# Patient Record
Sex: FEMALE | Race: BLACK OR AFRICAN AMERICAN | Employment: OTHER | URBAN - METROPOLITAN AREA
[De-identification: names, ages, dates, MRNs, and addresses within clinical notes are randomized per-mention and may not be internally consistent; named-entity substitution may affect disease eponyms.]

---

## 2017-03-06 ENCOUNTER — HOSPITAL ENCOUNTER (OUTPATIENT)
Dept: PHYSICAL THERAPY | Age: 52
Discharge: HOME | End: 2017-03-06
Payer: OTHER GOVERNMENT

## 2017-03-06 PROCEDURE — 97163 PT EVAL HIGH COMPLEX 45 MIN: CPT | Performed by: PHYSICAL THERAPIST

## 2017-03-06 PROCEDURE — 97110 THERAPEUTIC EXERCISES: CPT | Performed by: PHYSICAL THERAPIST

## 2017-03-06 NOTE — PROGRESS NOTES
PT DAILY TREATMENT NOTE/LUMBAR EVAL 3-16    Patient Name: Anamaria Joe  Date:3/6/2017  : 1965  [x]  Patient  Verified  Payor:  / Plan: [facility]  ACTIVE DUTY AND DEPENDENTS / Product Type: Yeimyfranco Munoza /    In time:3:05  Out time: 4:15  Total Treatment Time (min): 60  Visit #: 1 of 24    Treatment Area: Fibromyalgia [M79.7]  Other spondylosis, lumbar region [M47.896]  SUBJECTIVE  Pain Level (0-10 scale): 10  [x]constant []intermittent []improving [x]worsening []no change since onset    Any medication changes, allergies to medications, adverse drug reactions, diagnosis change, or new procedure performed?: [x] No    [] Yes (see summary sheet for update)  Subjective functional status/changes:     PLOF: amb with SPC  Limitations to PLOF: walking, standing, cleaning, dresssing lowe body, bending,   Mechanism of Injury: Insidious, work tasks. Current symptoms/Complaints: pain in neck and back and radiates into right leg. Previous Treatment/Compliance: n/a  PMHx/Surgical Hx: obesity. OA, spondylosis, chronic back pain, depression, fibromyalgia, Asthma, HTN, Latex allergy   Work Hx: prior , unemployed at this time. Living Situation: lives with son, son 25 yo and assists with some household chores. Has 3-4 steps to enter home. Son has stairs to his bedroom. Pt Goals: to get rid of pain and improve ability to dress and function. Barriers: []pain []financial []time []transportation []other  Motivation: fair+  Substance use: []Alcohol []Tobacco []other:   FABQ Score: []low []elevate  Cognition: A & O x 4    Other:    OBJECTIVE/EXAMINATION  Domestic Life: mother of 25 yo,    Activity/Recreational Limitations: none, limited due to pain. Mobility: Amb with SPC, guarded, short community distances and uses walker of rollator for community distances. Self Care: guarded, difficulty, increased time needed to complete tasks.        Modality rationale: decrease edema, decrease inflammation, decrease pain, increase tissue extensibility and increase muscle contraction/control to improve the patients ability to improve mobility    Min Type Additional Details    [] Estim:  []Unatt       []IFC  []Premod                        []Other:  []w/ice   []w/heat  Position:  Location:    [] Estim: []Att    []TENS instruct  []NMES                    []Other:  []w/US   []w/ice   []w/heat  Position:  Location:    []  Traction: [] Cervical       []Lumbar                       [] Prone          []Supine                       []Intermittent   []Continuous Lbs:  [] before manual  [] after manual    []  Ultrasound: []Continuous   [] Pulsed                           []1MHz   []3MHz Location:  W/cm2:    []  Iontophoresis with dexamethasone         Location: [] Take home patch   [] In clinic    []  Ice     []  heat  []  Ice massage  []  Laser   []  Anodyne Position:  Location:    []  Laser with stim  []  Other: Position:  Location:    []  Vasopneumatic Device Pressure:       [] lo [] med [] hi   Temperature: [] lo [] med [] hi   [x] Skin assessment post-treatment:  [x]intact [x]redness- no adverse reaction    []redness - adverse reaction:     45 min [x]Eval                  []Re-Eval       15 min Therapeutic Exercise:  [x] See flow sheet :   Rationale: increase ROM, increase strength, improve coordination, improve balance and increase proprioception to improve the patients ability to improve mobility               With   [] TE   [] TA   [] neuro   [] other: Patient Education: [x] Review HEP    [] Progressed/Changed HEP based on:   [] positioning   [] body mechanics   [] transfers   [] heat/ice application    [] other:      Other Objective/Functional Measures:  ObeseBMI > 30 %    Physical Therapy Evaluation - Lumbar Spine ([facility])    SUBJECTIVE  Chief Complaint: neck and back pain with rad.  In right leg along Dermatome L5  Mechanism of injury: 2003 lifting items into a truck in the [employer], pain started and persisted since then. Symptoms:  Aggravated by:   [x] Bending [x] Sitting [x] Standing [x] Walking   [] Moving [] Cough [] Sneeze [] Valsalva   [] AM  [] PM  Lying:  [] sup   [] pro   [] sidelying   [] Other: walking, sleeping on back or right side. Eased by:    [] Bending [] Sitting [] Standing [] Walking   [] Moving [] AM  [] PM  Lying: [x] sup  [] pro  [x] sidelying   [] Other:     General Health:  Red Flags Indicated? [] Yes    [] No  [] Yes [] No Recent weight change (If yes, due to dieting? [] Yes  [] No)   [] Yes [] No Weakness in legs during walking  [] Yes [] No Unremitting pain at night  [] Yes [] No Abdominal pain or problems  [] Yes [] No Rectal bleeding  [] Yes [] No Feet more cold or painful in cold weather  [] Yes [] No Menstrual irregularities  [] Yes [] No Blood or pain with urination  [] Yes [] No Dysfunction of bowel or bladder  [] Yes [] No Recent illness within past 3 weeks (i.e, cold, flu)  [] Yes [] No Numbness/tingling in buttock/genitalia region    Past History/Treatments:     Diagnostic Tests: [x] Lab work [] X-rays    [] CT [x] MRI     [] Other:  Results:pt uncertain of results and reports she may have bulging discs. PT asked client to bring in any written reports for MRI or x-rays on next session. Functional Status  Prior level of function:  -50Present functional limitations:  What position do you sleep in?:    OBJECTIVE  Posture:  Lateral Shift: [] R    [] L     [] +  [] -  Kyphosis: [x] Increased [] Decreased   []  WNL  Lordosis:  [] Increased [x] Decreased   [] WNL  Pelvic symmetry: [] WNL    [] Other:to difficult to assess due to guarding with motion and difficulty performing forward flexion test due to pain and intermittent spasms in the right leg. Gait:  [] Normal     [] Abnormal:    Active Movements: [] N/A   [] Too acute   [] Other:  ROM % AROM % PROM Comments:pain, area   Forward flexion 40-60 -50%  Pain and guarding with all motions.     Extension 20-30 -25%     SB right 20-30 -50%     SB left 20-30 -50%     Rotation right 5-10 -50%     Rotation left 5-10 -50%       Repeated Movements   Effects on present pain: produces (IL), abolishes (A), increases (incr), decreases (decr), centralizes (C), peripheral (PH), no effect (NE)   Pre-Test Sx Flexion Repeated Flexion Extension Repeated Extension Repeated SBL Repeated SBR   Sitting  NC  NC NC     Standing  NC  NC NC     Lying  NC  NC NC N/A N/A   Comments: pt could not determined if Flex or Ext made a difference to her pain due to being in moderate pain this date. Side Glide:  Sustained passive positioning test:    Neuro Screen [] WNL  Myotome/Dermatome/Reflexes: L4 knee jerk 1+ diminished  Comments:  Sensation diminished in right shin on lateral side. Dural Mobility:  SLR Sitting: [] R    [] L    [] +    [] -  @ (degrees):           Supine: [] R    [] L    [] +    [] -  @ (degrees):   Slump Test: [] R    [] L    [] +    [] -  @ (degrees):   Prone Knee Bend: [] R    [] L    [] +    [] -     Palpation  [] Min  [] Mod  [x] Severe    Location: chata quads, paraspinals, IT bands,   [] Min  [] Mod  [x] Severe    Location: buttocks  [] Min  [] Mod  [x] Severe    Location: chata UT, Rhomboids     Strength   L(0-5) R (0-5) N/T   Hip Flexion (L1,2) 4- 4- []   Knee Extension (L3,4) 4- 4- []   Ankle Dorsiflexion (L4) 5 5 []   Great Toe Extension (L5) 5 5 []   Ankle Plantarflexion (S1) 5 5 []   Knee Flexion (S1,2) 4- 4- []   Upper Abdominals   [x]   Lower Abdominals Pain  Pain  [x]   Paraspinals Pain  Pain  [x]   Back Rotators   [x]   Gluteus Robert 4- 4- []   Other   []     Special Tests  Lumbar:  Lumb.  Compression: [] Pos  [] Neg               Lumbar Distraction:   [] Pos  [] Neg    Quadrant:  [] Pos  [] Neg   [] Flex  [] Ext    Sacroilliac:  Gaenslen's: [] R    [] L    [] +    [] -     Compression: [] +    [] -     Gapping:  [] +    [] -     Thigh Thrust: [] R    [] L    [] +    [] -     Leg Length: [] +    [] -   Position:    Crests:  Pt TTP on assessment  ASIS:     PSIS:     Sacral Sulcus:    Mobility: Standing flex: not tested due to pain in low back     Sitting flex: pain still present in low back. Supine to sit:     Prone knee bend:NT         Hip: Peletier Kayla:  [] R    [] L    [] +    [x] -     Scour:  [] R    [] L    [] +    [x] -     Piriformis: [x] R    [x] L    [x] +    [] -          Deficits: Jose's: [] R    [] L    [] +    [] -     Georgiana : [] R    [] L    [] +    [] -     Hamstrings 90/90: 31 with pain in bilateral     Gastrocsoleus (to neutral): Right: Left:       Global Muscular Weakness:  Abdominals:  Quadratus Lumborum:  Paraspinals: Other:    Other tests/comments:       Pain Level (0-10 scale) post treatment: 9    ASSESSMENT/Changes in Function:  See POC    Patient will continue to benefit from skilled PT services to modify and progress therapeutic interventions, address functional mobility deficits, address ROM deficits, address strength deficits, analyze and address soft tissue restrictions, analyze and cue movement patterns, analyze and modify body mechanics/ergonomics, assess and modify postural abnormalities and address imbalance/dizziness to attain remaining goals.      [x]  See Plan of Care  []  See progress note/recertification  []  See Discharge Summary         Progress towards goals / Updated goals:  See POC    PLAN  [x]  Upgrade activities as tolerated     [x]  Continue plan of care  []  Update interventions per flow sheet       []  Discharge due to:_  []  Other:_      Maury Mistry, PT 3/6/2017  3:53 PM

## 2017-03-06 NOTE — PROGRESS NOTES
[facility]  [address] [place], [address]  Ph [phone]  Fx [phone]    Plan of Care/ Statement of Necessity for Physical Therapy Services    Patient name: Roland Miranda Start of Care: 3/6/2017   Referral source: Scarlett Goldstein NP : 1965    Medical Diagnosis: Fibromyalgia [M79.7]  Other spondylosis, lumbar region [M47.896]   Onset Date:     Treatment Diagnosis: Neck pain, back pain, general weakness, difficulty walking, decreased endurance. Spondylosis, radiculopathy in right LE along L4-L5 dermatome. Prior Hospitalization: see medical history Provider#: [id number]   Medications: Verified on Patient summary List    Comorbidities: Chronic back pain, morbid obesity, Fibromyalgia, Depression, Arthritis, HTN, Latex allergy, Asthma,    Prior Level of Function: Amb. with SPC, Mod I with use of cane, Prior community Ambulator. The Plan of Care and following information is based on the information from the initial evaluation. 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 Pt reports her pain began during her deployment in  and her s/sx have worsen over the years. Pt describes pain as sharp and radiating in right LE and achy in the neck and back. Pt reports the pain can be intense and sharp down the right leg along L5 dermatome. Pain increases with walking, bending, cleaning, grooming, prolong standing and sitting. Pt reports poor quality of sleeping. Pt also amb with SPC with unsteady gait and intermittent sharp pain into her right leg. Pt denies any inejctions into the spine other than trigger point injections. Pt states she is not certain of having an MRI performed of the spine. Pt reports pain with driving as well and gets some assistance form her 25 yo son.  Pt has limited CTL spine AROM by 50% with FF and Jose Miguel SB and ROT mostly due to pain and guarding. Pt has 4-/5 jose miguel LE MMT due to pain and fatigue. Pt has 4+/5 jose miguel UE MMT with report on increasing pain in her wrist and elbows. Pt has negative SLR and Spurling's test this date, however she reported general muscle pain with both testing positions. Pt has a TUG score of 33 sec with SPC showing moderate fall risk. Pt will benefit from PT services to address the findings and improve general mobility and balance. Evaluation Complexity History HIGH Complexity :3+ comorbidities / personal factors will impact the outcome/ POC ; Examination HIGH Complexity : 4+ Standardized tests and measures addressing body structure, function, activity limitation and / or participation in recreation  ;Presentation HIGH Complexity : Unstable and unpredictable characteristics  ; Clinical Decision Making HIGH Complexity : FOTO score of 1- 25   Overall Complexity Rating: HIGH   Problem List: pain affecting function, decrease ROM, decrease strength, edema affecting function, impaired gait/ balance, decrease ADL/ functional abilitiies, decrease activity tolerance, decrease flexibility/ joint mobility and decrease transfer abilities   Treatment Plan may include any combination of the following: Therapeutic exercise, Therapeutic activities, Neuromuscular re-education, Physical agent/modality, Gait/balance training, Manual therapy, Aquatic therapy, Patient education, Self Care training, Functional mobility training, Home safety training and Stair training  Patient / Family readiness to learn indicated by: asking questions, trying to perform skills and interest  Persons(s) to be included in education: patient (P)  Barriers to Learning/Limitations: None  Patient Goal (s): To have less pain.   Patient Self Reported Health Status: poor  Rehabilitation Potential: fair    Short Term Goals:  To be accomplished in 4 weeks:   1) Pt will be instructed with a HEP to promote 100% compliance to improve ROM and mobility.    @ Eval: HEP issued. 2) Pt will report 6-7/10 pain at worst in her neck and back to improve exercise tolerance and mobility.    @ Eval: 10/10 at worst in neck and back with radiating LE pain. 3) Pt will have increase FOTO score by 8-10 points to show improve mobility tolerance.   @ Eval: 22/100   4) Pt will report decreased radiating pain into leg by 50% to show centralization of nerve root pain in her right LE wit use of flexion or extension biased lumbar exercises. @ Eval: 100% or constant right LE radiculopathy along L5 dermatome. 5) Pt will have decreased TUG score by 3-5 seconds with LRAD to represent decreased fall risk and improved ambulation tolerance.    @ Eval: 33 sec with SPC and staggering gait sequence. Long Term Goals: To be accomplished in 8 weeks:   1) Pt will have full CTL AROM in all planes with minimal c/o pain to improve dressing of lower extremity. @ Eval; Pt has 50% limited flexion, chata ROT and SB in CTL spine. 2) Pt will report 4/10 pain at worst in her neck and back to improve exercise tolerance and mobility.    @ Eval: 10/10 at worst in neck and back with radiating LE pain. 3) Pt will have increase FOTO score by 11-20 points to show improve mobility tolerance.   @ Eval: 22/100   4) Pt will not have radiating pain into leg to promote return to her PLOF.    @ Eval: 100% or constant right LE radiculopathy along L5 dermatome. 5) Pt will have 4+/5 chata LE MMT to improve community ambulation tolerance with LRAD.   @ Eval: chata LE MMT 4-/5 throughout. 6) Pt will have decreased TUG score by 8-10 seconds with LRAD to represent decreased fall risk and improved ambulation tolerance.    @ Eval: 33 sec with SPC and staggering gait sequence. Frequency / Duration: Patient to be seen 3 times per week for 8 weeks.     Patient/ Caregiver education and instruction: Diagnosis, prognosis, self care, activity modification, brace/ splint application and exercises   [x]  Plan of care has been reviewed with LILLY Caceres, PT 3/6/2017 3:52 PM    ________________________________________________________________________    I certify that the above Therapy Services are being furnished while the patient is under my care. I agree with the treatment plan and certify that this therapy is necessary.     Physician's Signature:____________________  Date:____________Time: _________    Please sign and return to [facility]  [address], [address]  Ph [phone]  Fx [phone]

## 2017-03-10 ENCOUNTER — HOSPITAL ENCOUNTER (OUTPATIENT)
Dept: PHYSICAL THERAPY | Age: 52
Discharge: HOME | End: 2017-03-10
Payer: OTHER GOVERNMENT

## 2017-03-10 PROCEDURE — 97140 MANUAL THERAPY 1/> REGIONS: CPT

## 2017-03-10 PROCEDURE — 97110 THERAPEUTIC EXERCISES: CPT

## 2017-03-10 NOTE — PROGRESS NOTES
PT DAILY TREATMENT NOTE     Patient Name: Rupa iMrza  Date:3/10/2017  : 1965  [x]  Patient  Verified  Payor:  / Plan: [facility]  ACTIVE DUTY AND DEPENDENTS / Product Type: Deborah Muhammadippo /    In time:8:00  Out time:9:10  Total Treatment Time (min): 70  Visit #: 2 of 24    Treatment Area: Fibromyalgia [M79.7]  Other spondylosis, lumbar region [M47.896]    SUBJECTIVE  Pain Level (0-10 scale): 10/10  Any medication changes, allergies to medications, adverse drug reactions, diagnosis change, or new procedure performed?: [x] No    [] Yes (see summary sheet for update)  Subjective functional status/changes:   [] No changes reported  \"I just hurt. \"    OBJECTIVE    Modality rationale: decrease pain to improve the patients ability to tolerate positions and ADLs.    Min Type Additional Details    [] Estim:  []Unatt       []IFC  []Premod                        []Other:  []w/ice   []w/heat  Position:  Location:    [] Estim: []Att    []TENS instruct  []NMES                    []Other:  []w/US   []w/ice   []w/heat  Position:  Location:    []  Traction: [] Cervical       []Lumbar                       [] Prone          []Supine                       []Intermittent   []Continuous Lbs:  [] before manual  [] after manual    []  Ultrasound: []Continuous   [] Pulsed                           []1MHz   []3MHz W/cm2:  Location:    []  Iontophoresis with dexamethasone         Location: [] Take home patch   [] In clinic   10 []  Ice     [x]  heat  []  Ice massage  []  Laser   []  Anodyne Position: prone  Location: applied to neck and S/T region    []  Laser with stim  []  Other:  Position:  Location:    []  Vasopneumatic Device Pressure:       [] lo [] med [] hi   Temperature: [] lo [] med [] hi   [] Skin assessment post-treatment:  []intact []redness- no adverse reaction    []redness - adverse reaction:      min []Eval                  []Re-Eval       13 min Therapeutic Exercise:  [] See flow sheet :  Added prone heel presses, glutes sets, bridges with adduction   Rationale: increase strength and increase stability to improve the patients ability to maintain innominate alignment and tolerate positions. min Therapeutic Activity:  []  See flow sheet :         min Neuromuscular Re-education:  []  See flow sheet :       47 min Manual Therapy:    METs to correct anteriorly rotated right innominate; Ramirez MFR to the B T-spine and L/S regions (crossed arm technique)   Rationale: decrease pain, increase ROM, increase tissue extensibility and correct joint alignment and joint mechanics to tolerate positions and normalize ADLs. min Gait Training:  ___ feet with ___ device on level surfaces with ___ level of assist   Rationale: With   [] TE   [] TA   [] neuro   [] other: Patient Education: [x] Review HEP    [] Progressed/Changed HEP based on:   [] positioning   [] body mechanics   [] transfers   [] heat/ice application    [] other:      Other Objective/Functional Measures:   left LE lengthens in sitting- anteriorly rotated right innomintate  right standing flexion test  Moderate-significant myofacial tightness throughout the T-spine and L/S regions     Pain Level (0-10 scale) post treatment: 8/10    ASSESSMENT/Changes in Function:    Anteriorly rotated right innominate corrected today, but pt experienced increased tension and pain   Myofacial release was initially uncomfortable and needed to be modified to allow pt to tolerate treatment. Patient will continue to benefit from skilled PT services to modify and progress therapeutic interventions, address functional mobility deficits, address ROM deficits, address strength deficits, analyze and address soft tissue restrictions, analyze and cue movement patterns, analyze and modify body mechanics/ergonomics, assess and modify postural abnormalities and address imbalance/dizziness to attain remaining goals.      []  See Plan of Care  []  See progress note/recertification  []  See Discharge Summary         Progress towards goals / Updated goals:  Short Term Goals: To be accomplished in 4 weeks:  1) Pt will be instructed with a HEP to promote 100% compliance to improve ROM and mobility.   @ Eval: HEP issued. Current status: not reassessed    2) Pt will report 6-7/10 pain at worst in her neck and back to improve exercise tolerance and mobility.   @ Eval: 10/10 at worst in neck and back with radiating LE pain. Current status: not reassessed    3) Pt will have increase FOTO score by 8-10 points to show improve mobility tolerance.  @ Eval: 22/100  Current status: not reassessed    4) Pt will report decreased radiating pain into leg by 50% to show centralization of nerve root pain in her right LE wit use of flexion or extension biased lumbar exercises. @ Eval: 100% or constant right LE radiculopathy along L5 dermatome. Current status: not reassessed    5) Pt will have decreased TUG score by 3-5 seconds with LRAD to represent decreased fall risk and improved ambulation tolerance.   @ Eval: 33 sec with SPC and staggering gait sequence. Current status: not reassessed      Long Term Goals: To be accomplished in 8 weeks:  1) Pt will have full CTL AROM in all planes with minimal c/o pain to improve dressing of lower extremity. @ Eval; Pt has 50% limited flexion, chata ROT and SB in CTL spine  Current status: not reassessed    2) Pt will report 4/10 pain at worst in her neck and back to improve exercise tolerance and mobility.   @ Eval: 10/10 at worst in neck and back with radiating LE pain. Current status: not reassessed    3) Pt will have increase FOTO score by 11-20 points to show improve mobility tolerance.  @ Eval: 22/100  Current status: not reassessed    4) Pt will not have radiating pain into leg to promote return to her PLOF.   @ Eval: 100% or constant right LE radiculopathy along L5 dermatome.   Current status: not reassessed    5) Pt will have 4+/5 chata LE MMT to improve community ambulation tolerance with LRAD.  @ Eval: chata LE MMT 4-/5 throughout. Current status: not reassessed     6) Pt will have decreased TUG score by 8-10 seconds with LRAD to represent decreased fall risk and improved ambulation tolerance.   @ Eval: 33 sec with SPC and staggering gait sequence.    Current status: not reassessed    PLAN  []  Upgrade activities as tolerated     [x]  Continue plan of care  []  Update interventions per flow sheet       []  Discharge due to:_  []  Other:_      Giancarlo Rabago, PT 3/10/2017  8:10 AM    Future Appointments  Date Time Provider [place]   3/13/2017 4:30 PM Alma Lofton, PT [facility]   3/15/2017 9:30 AM Giancarlo Rabago, PT [facility]   3/20/2017 3:00 PM Giancarlo Rabago PT [facility]   3/23/2017 9:00 AM Giancarlo Rabago, PT [facility]

## 2017-03-13 ENCOUNTER — HOSPITAL ENCOUNTER (OUTPATIENT)
Dept: PHYSICAL THERAPY | Age: 52
Discharge: HOME | End: 2017-03-13
Payer: OTHER GOVERNMENT

## 2017-03-13 PROCEDURE — 97110 THERAPEUTIC EXERCISES: CPT | Performed by: PHYSICAL THERAPIST

## 2017-03-13 PROCEDURE — 97014 ELECTRIC STIMULATION THERAPY: CPT | Performed by: PHYSICAL THERAPIST

## 2017-03-13 NOTE — PROGRESS NOTES
PT DAILY TREATMENT NOTE     Patient Name: Lisa Lan  Date:3/13/2017  : 1965  [x]  Patient  Verified  Payor:  / Plan: [facility]  ACTIVE DUTY AND DEPENDENTS / Product Type: Aparna Barrs /    In time:4:17  Out time:5:29  Total Treatment Time (min): 55  Visit #: 3 of 24    Treatment Area: Fibromyalgia [M79.7]  Other spondylosis, lumbar region [M47.896]    SUBJECTIVE  Pain Level (0-10 scale): 9  Any medication changes, allergies to medications, adverse drug reactions, diagnosis change, or new procedure performed?: [x] No    [] Yes (see summary sheet for update)  Subjective functional status/changes:   [] No changes reported  I feel achy all over. When asked what changed pt reported she went for a 2 mile walk to [facility] with a friend.      OBJECTIVE    Modality rationale: decrease inflammation, decrease pain and increase tissue extensibility to improve the patients ability to improve mobility    Min Type Additional Details   15 [x] Estim:  [x]Unatt       [x]IFC  []Premod                        [x]Other: personal home unit assessed and pt instructed on device []w/ice   []w/heat  Position: prone  Location: neck and upper and lower back    5 [] Estim: []Att    [x]TENS instruct  []NMES                    []Other:  Setting for pain types discussed []w/US   []w/ice   [x]w/heat  Position:  Location:    []  Traction: [] Cervical       []Lumbar                       [] Prone          []Supine                       []Intermittent   []Continuous Lbs:  [] before manual  [] after manual    []  Ultrasound: []Continuous   [] Pulsed                           []1MHz   []3MHz W/cm2:  Location:    []  Iontophoresis with dexamethasone         Location: [] Take home patch   [] In clinic     []  Ice     [x]  heat  []  Ice massage  []  Laser   []  Anodyne Position:    Location:       []  Laser with stim  []  Other:  Position:  Location:    []  Vasopneumatic Device Pressure:       [] lo [] med [] hi   Temperature: [] lo [] med [] hi   [x] Skin assessment post-treatment:  [x]intact [x]redness- no adverse reaction    []redness - adverse reaction:       35 min Therapeutic Exercise:  [x] See flow sheet :   Rationale: increase ROM, increase strength, improve coordination, improve balance and increase proprioception to improve the patients ability to improve mobility and ADL tolerance. With   [x] TE   [] TA   [] neuro   [] other: Patient Education: [x] Review HEP    [x] Progressed/Changed HEP based on:   [] positioning   [] body mechanics   [] transfers   [] heat/ice application    [] other: activity conservation and slow progression for workout routine. Other Objective/Functional Measures: focused on core stabs with pt reporting decreased pain with extension biased there-ex this date. Pain Level (0-10 scale) post treatment: 8    ASSESSMENT/Changes in Function: pt tolerated session fair. Pt was instructed on proper use of her personal TENS. Pt stated she has had her unit for 3 yrs, however she was never trained or educated on the parameters. Patient will continue to benefit from skilled PT services to modify and progress therapeutic interventions, address functional mobility deficits, address ROM deficits, address strength deficits, analyze and address soft tissue restrictions, analyze and cue movement patterns, analyze and modify body mechanics/ergonomics, assess and modify postural abnormalities and address imbalance/dizziness to attain remaining goals. []  See Plan of Care  []  See progress note/recertification  []  See Discharge Summary         Progress towards goals / Updated goals:  Short Term Goals: To be accomplished in 4 weeks:  1) Pt will be instructed with a HEP to promote 100% compliance to improve ROM and mobility.   @ Eval: HEP issued.    Current status: progressing with tolerance and compliance @ 50% this date.      2) Pt will report 6-7/10 pain at worst in her neck and back to improve exercise tolerance and mobility.   @ Eval: 10/10 at worst in neck and back with radiating LE pain. Current status: 9/10 3/13/2017     3) Pt will have increase FOTO score by 8-10 points to show improve mobility tolerance.  @ Eval: 22/100  Current status: not reassessed     4) Pt will report decreased radiating pain into leg by 50% to show centralization of nerve root pain in her right LE wit use of flexion or extension biased lumbar exercises. @ Eval: 100% or constant right LE radiculopathy along L5 dermatome. Current status: not reassessed     5) Pt will have decreased TUG score by 3-5 seconds with LRAD to represent decreased fall risk and improved ambulation tolerance.   @ Eval: 33 sec with SPC and staggering gait sequence. Current status: not reassessed       Long Term Goals: To be accomplished in 8 weeks:  1) Pt will have full CTL AROM in all planes with minimal c/o pain to improve dressing of lower extremity. @ Eval; Pt has 50% limited flexion, chata ROT and SB in CTL spine  Current status: not reassessed     2) Pt will report 4/10 pain at worst in her neck and back to improve exercise tolerance and mobility.   @ Eval: 10/10 at worst in neck and back with radiating LE pain. Current status: not reassessed     3) Pt will have increase FOTO score by 11-20 points to show improve mobility tolerance.  @ Eval: 22/100  Current status: not reassessed     4) Pt will not have radiating pain into leg to promote return to her PLOF.   @ Eval: 100% or constant right LE radiculopathy along L5 dermatome. Current status: not reassessed     5) Pt will have 4+/5 chata LE MMT to improve community ambulation tolerance with LRAD.  @ Eval: chata LE MMT 4-/5 throughout. Current status: not reassessed      6) Pt will have decreased TUG score by 8-10 seconds with LRAD to represent decreased fall risk and improved ambulation tolerance.   @ Eval: 33 sec with SPC and staggering gait sequence.    Current status: not reassessed    PLAN  [x] Upgrade activities as tolerated     [x]  Continue plan of care  []  Update interventions per flow sheet       []  Discharge due to:_  []  Other:_      Stew Rolle PT 3/13/2017  5:48 PM    Future Appointments  Date Time Provider [place]   3/15/2017 9:30 AM Umm Gotti PT [facility]   3/20/2017 3:00 PM Umm Gotti PT [facility]   3/23/2017 9:00 AM Umm Gotti PT [facility]

## 2017-03-15 ENCOUNTER — HOSPITAL ENCOUNTER (OUTPATIENT)
Dept: PHYSICAL THERAPY | Age: 52
Discharge: HOME | End: 2017-03-15
Payer: OTHER GOVERNMENT

## 2017-03-15 PROCEDURE — 97110 THERAPEUTIC EXERCISES: CPT | Performed by: PHYSICAL THERAPIST

## 2017-03-15 PROCEDURE — 97112 NEUROMUSCULAR REEDUCATION: CPT | Performed by: PHYSICAL THERAPIST

## 2017-03-15 NOTE — PROGRESS NOTES
PT DAILY TREATMENT NOTE     Patient Name: Roland Miranda   Date:3/15/2017  : 1965  [x]  Patient  Verified  Payor:  / Plan: [facility]  ACTIVE DUTY AND DEPENDENTS / Product Type:  /    In time: 9:30  Out time  10:30  Total Treatment Time (min): 55  Visit #: 4 of 24    Treatment Area: Fibromyalgia [M79.7]  Other spondylosis, lumbar region [M47896]    SUBJECTIVE  Pain Level (0-10 scale): 8  Any medication changes, allergies to medications, adverse drug reactions, diagnosis change, or new procedure performed?: [x] No    [] Yes (see summary sheet for update)  Subjective functional status/changes:   [] No changes reported  I am sore    OBJECTIVE    Modality rationale: decrease edema, decrease inflammation, decrease pain and increase tissue extensibility to improve the patients ability to improve mobility    Min Type Additional Details    [] Estim:  []Unatt       []IFC  []Premod                        []Other:  []w/ice   []w/heat  Position:  Location:    [] Estim: []Att    []TENS instruct  []NMES                    []Other:  []w/US   []w/ice   []w/heat  Position:  Location:    []  Traction: [] Cervical       []Lumbar                       [] Prone          []Supine                       []Intermittent   []Continuous Lbs:  [] before manual  [] after manual    []  Ultrasound: []Continuous   [] Pulsed                           []1MHz   []3MHz W/cm2:  Location:    []  Iontophoresis with dexamethasone         Location: [] Take home patch   [] In clinic   10 []  Ice     [x]  heat  []  Ice massage  []  Laser   []  Anodyne Position: supine  Location: neck and back.      []  Laser with stim  []  Other:  Position:  Location:    []  Vasopneumatic Device Pressure:       [] lo [] med [] hi   Temperature: [] lo [] med [] hi   [] Skin assessment post-treatment:  []intact []redness- no adverse reaction    []redness - adverse reaction:     30  min Therapeutic Exercise:  [x] See flow sheet :   Rationale: increase ROM, increase strength, improve coordination, improve balance and increase proprioception to improve the patients ability to improve mobility       15 min Neuromuscular Re-education:  [x]  See flow sheet : Core stabs   Rationale: increase ROM, increase strength, improve coordination and improve balance  to improve the patients ability to improve mobility                With   [x] TE   [] TA   [] neuro   [] other: Patient Education: [x] Review HEP    [x] Progressed/Changed HEP based on:   [] positioning   [] body mechanics   [] transfers   [] heat/ice application    [] other:      Other Objective/Functional Measures: none     Pain Level (0-10 scale) post treatment: 7    ASSESSMENT/Changes in Function: pt tolerated session well this date with improved exercise tolerance. Patient will continue to benefit from skilled PT services to modify and progress therapeutic interventions, address functional mobility deficits, address ROM deficits, address strength deficits, analyze and address soft tissue restrictions, analyze and cue movement patterns, analyze and modify body mechanics/ergonomics, assess and modify postural abnormalities and address imbalance/dizziness to attain remaining goals. []  See Plan of Care  []  See progress note/recertification  []  See Discharge Summary         Progress towards goals / Updated goals:  Short Term Goals: To be accomplished in 4 weeks:  1) Pt will be instructed with a HEP to promote 100% compliance to improve ROM and mobility.   @ Eval: HEP issued. Current status: progressing with tolerance and compliance @ 50% this date.      2) Pt will report 6-7/10 pain at worst in her neck and back to improve exercise tolerance and mobility.   @ Eval: 10/10 at worst in neck and back with radiating LE pain.   Current status: 9/10 3/13/2017      3) Pt will have increase FOTO score by 8-10 points to show improve mobility tolerance.  @ Eval: 22/100  Current status: not reassessed      4) Pt will report decreased radiating pain into leg by 50% to show centralization of nerve root pain in her right LE wit use of flexion or extension biased lumbar exercises. @ Eval: 100% or constant right LE radiculopathy along L5 dermatome. Current status: not reassessed      5) Pt will have decreased TUG score by 3-5 seconds with LRAD to represent decreased fall risk and improved ambulation tolerance.   @ Eval: 33 sec with SPC and staggering gait sequence. Current status: not reassessed       Long Term Goals: To be accomplished in 8 weeks:  1) Pt will have full CTL AROM in all planes with minimal c/o pain to improve dressing of lower extremity. @ Eval; Pt has 50% limited flexion, chata ROT and SB in CTL spine  Current status: not reassessed      2) Pt will report 4/10 pain at worst in her neck and back to improve exercise tolerance and mobility.   @ Eval: 10/10 at worst in neck and back with radiating LE pain. Current status: not reassessed      3) Pt will have increase FOTO score by 11-20 points to show improve mobility tolerance.  @ Eval: 22/100  Current status: not reassessed      4) Pt will not have radiating pain into leg to promote return to her PLOF.   @ Eval: 100% or constant right LE radiculopathy along L5 dermatome. Current status: not reassessed      5) Pt will have 4+/5 chata LE MMT to improve community ambulation tolerance with LRAD.  @ Eval: chata LE MMT 4-/5 throughout. Current status: not reassessed      6) Pt will have decreased TUG score by 8-10 seconds with LRAD to represent decreased fall risk and improved ambulation tolerance.   @ Eval: 33 sec with SPC and staggering gait sequence.    Current status: not reassessed       PLAN  [x]  Upgrade activities as tolerated     [x]  Continue plan of care  []  Update interventions per flow sheet       []  Discharge due to:_  []  Other:_      Michael Amaro, PT 3/15/2017  9:45 AM    Future Appointments  Date Time Provider Department [place]   3/20/2017 3:00 PM Eldred Angelucci, PT [facility]   3/23/2017 9:00 AM Eldred Angelucci, PT [facility]

## 2017-03-20 ENCOUNTER — HOSPITAL ENCOUNTER (OUTPATIENT)
Dept: PHYSICAL THERAPY | Age: 52
Discharge: HOME | End: 2017-03-20
Payer: OTHER GOVERNMENT

## 2017-03-20 PROCEDURE — 97112 NEUROMUSCULAR REEDUCATION: CPT

## 2017-03-20 PROCEDURE — 97110 THERAPEUTIC EXERCISES: CPT

## 2017-03-20 NOTE — PROGRESS NOTES
PT DAILY TREATMENT NOTE     Patient Name: Linda Mckenzie  Date:3/20/2017  : 1965  [x]  Patient  Verified  Payor:  / Plan: [facility]  ACTIVE DUTY AND DEPENDENTS / Product Type: Hitesh Dougherty /    In time:3:08  Out time:4:09  Total Treatment Time (min): 61  Visit #: 5 of 24    Treatment Area: Fibromyalgia [M79.7]  Other spondylosis, lumbar region [M47.896]    SUBJECTIVE  Pain Level (0-10 scale): 10/10  Any medication changes, allergies to medications, adverse drug reactions, diagnosis change, or new procedure performed?: [x] No    [] Yes (see summary sheet for update)  Subjective functional status/changes:   [] No changes reported      OBJECTIVE    Modality rationale: decrease pain to improve the patients ability to tolerate positions and ADLs.    Min Type Additional Details    [] Estim:  []Unatt       []IFC  []Premod                        []Other:  []w/ice   []w/heat  Position:  Location:    [] Estim: []Att    []TENS instruct  []NMES                    []Other:  []w/US   []w/ice   []w/heat  Position:  Location:    []  Traction: [] Cervical       []Lumbar                       [] Prone          []Supine                       []Intermittent   []Continuous Lbs:  [] before manual  [] after manual    []  Ultrasound: []Continuous   [] Pulsed                           []1MHz   []3MHz W/cm2:  Location:    []  Iontophoresis with dexamethasone         Location: [] Take home patch   [] In clinic   10 []  Ice     [x]  heat  []  Ice massage  []  Laser   []  Anodyne Position: prone  Location: applied to neck and S/T region    []  Laser with stim  []  Other:  Position:  Location:    []  Vasopneumatic Device Pressure:       [] lo [] med [] hi   Temperature: [] lo [] med [] hi   [] Skin assessment post-treatment:  []intact []redness- no adverse reaction    []redness - adverse reaction:      min []Eval                  []Re-Eval       14 min Therapeutic Exercise:  [] See flow sheet :   Rationale: increase strength and increase stability to improve the patients ability to maintain innominate alignment and tolerate positions. min Therapeutic Activity:  []  See flow sheet :        37 min Neuromuscular Re-education:  []  See flow sheet :  Added transverse abdominus (TA) bracing with Swissball #1, #2, #3, added half prone hip extensions   Rationale: increase strength, improve coordination and increase proprioception  to improve the patients ability to tolerate positions and ADLs. min Manual Therapy:          min Gait Training:  ___ feet with ___ device on level surfaces with ___ level of assist   Rationale: With   [] TE   [] TA   [] neuro   [] other: Patient Education: [x] Review HEP    [] Progressed/Changed HEP based on:   [] positioning   [] body mechanics   [] transfers   [] heat/ice application    [] other:      Other Objective/Functional Measures:   Innominates aligned. Pain Level (0-10 scale) post treatment: 6/10    ASSESSMENT/Changes in Function:    Innominates remaining aligned and stable. Pain decreased by 40% after exercises today. Patient will continue to benefit from skilled PT services to modify and progress therapeutic interventions, address functional mobility deficits, address ROM deficits, address strength deficits, analyze and address soft tissue restrictions, analyze and cue movement patterns, analyze and modify body mechanics/ergonomics, assess and modify postural abnormalities and address imbalance/dizziness to attain remaining goals.      [] See Plan of Care  [] See progress note/recertification  [] See Discharge Summary      Progress towards goals / Updated goals:  Short Term Goals: To be accomplished in 4 weeks:  1) Pt will be instructed with a HEP to promote 100% compliance to improve ROM and mobility.   @ Eval: HEP issued.    Current status: progressing with tolerance and compliance @ 50% this date.       2) Pt will report 6-7/10 pain at worst in her neck and back to improve exercise tolerance and mobility.   @ Eval: 10/10 at worst in neck and back with radiating LE pain. Current status: 9/10 3/13/2017      3) Pt will have increase FOTO score by 8-10 points to show improve mobility tolerance.  @ Eval: 22/100  Current status: not reassessed      4) Pt will report decreased radiating pain into leg by 50% to show centralization of nerve root pain in her right LE wit use of flexion or extension biased lumbar exercises. @ Eval: 100% or constant right LE radiculopathy along L5 dermatome. Current status: not reassessed      5) Pt will have decreased TUG score by 3-5 seconds with LRAD to represent decreased fall risk and improved ambulation tolerance.   @ Eval: 33 sec with SPC and staggering gait sequence. Current status: not reassessed       Long Term Goals: To be accomplished in 8 weeks:  1) Pt will have full CTL AROM in all planes with minimal c/o pain to improve dressing of lower extremity. @ Eval; Pt has 50% limited flexion, chata ROT and SB in CTL spine  Current status: not reassessed      2) Pt will report 4/10 pain at worst in her neck and back to improve exercise tolerance and mobility.   @ Eval: 10/10 at worst in neck and back with radiating LE pain. Current status: not reassessed      3) Pt will have increase FOTO score by 11-20 points to show improve mobility tolerance.  @ Eval: 22/100  Current status: not reassessed      4) Pt will not have radiating pain into leg to promote return to her PLOF.   @ Eval: 100% or constant right LE radiculopathy along L5 dermatome. Current status: not reassessed      5) Pt will have 4+/5 chata LE MMT to improve community ambulation tolerance with LRAD.  @ Eval: chata LE MMT 4-/5 throughout. Current status: not reassessed      6) Pt will have decreased TUG score by 8-10 seconds with LRAD to represent decreased fall risk and improved ambulation tolerance.   @ Eval: 33 sec with SPC and staggering gait sequence.    Current status: not reassessed    PLAN  []  Upgrade activities as tolerated     [x]  Continue plan of care  []  Update interventions per flow sheet       []  Discharge due to:_  []  Other:_      Eldred Angelucci, PT 3/20/2017  4:31 PM    Future Appointments  Date Time Provider [place] [place]   3/23/2017 9:00 AM Eldred Angelucci, PT [facility]

## 2017-03-23 ENCOUNTER — HOSPITAL ENCOUNTER (OUTPATIENT)
Dept: PHYSICAL THERAPY | Age: 52
Discharge: HOME | End: 2017-03-23
Payer: OTHER GOVERNMENT

## 2017-03-23 PROCEDURE — 97530 THERAPEUTIC ACTIVITIES: CPT

## 2017-03-23 PROCEDURE — 97110 THERAPEUTIC EXERCISES: CPT

## 2017-03-23 NOTE — PROGRESS NOTES
PT DAILY TREATMENT NOTE     Patient Name: Gio Carney  Date:3/23/2017  : 1965  [x]  Patient  Verified  Payor:  / Plan: [facility]  ACTIVE DUTY AND DEPENDENTS / Product Type: Geralynn Clamp /    In time:9:00  Out time:9:45  Total Treatment Time (min): 45  Visit #: 6 of 24    Treatment Area: Fibromyalgia [M79.7]  Other spondylosis, lumbar region [[id number]]    SUBJECTIVE  Pain Level (0-10 scale): 7/10  Any medication changes, allergies to medications, adverse drug reactions, diagnosis change, or new procedure performed?: [x] No    [] Yes (see summary sheet for update)  Subjective functional status/changes:   [] No changes reported  \"My back and leg has lots of pain. I have more pain when I sit in my car than when I sit in my truck. \"    OBJECTIVE    Modality rationale:    Min Type Additional Details    [] Estim:  []Unatt       []IFC  []Premod                        []Other:  []w/ice   []w/heat  Position:  Location:    [] Estim: []Att    []TENS instruct  []NMES                    []Other:  []w/US   []w/ice   []w/heat  Position:  Location:    []  Traction: [] Cervical       []Lumbar                       [] Prone          []Supine                       []Intermittent   []Continuous Lbs:  [] before manual  [] after manual    []  Ultrasound: []Continuous   [] Pulsed                           []1MHz   []3MHz W/cm2:  Location:    []  Iontophoresis with dexamethasone         Location: [] Take home patch   [] In clinic    []  Ice     []  heat  []  Ice massage  []  Laser   []  Anodyne Position:  Location:    []  Laser with stim  []  Other:  Position:  Location:    []  Vasopneumatic Device Pressure:       [] lo [] med [] hi   Temperature: [] lo [] med [] hi   [] Skin assessment post-treatment:  []intact []redness- no adverse reaction    []redness - adverse reaction:      min []Eval                  []Re-Eval       15 min Therapeutic Exercise:  [x] See flow sheet :  Added REIL and EFRA at counter (to be performed every 1 hour while awake)   Rationale: decrease pain and decrease radiculopathy to improve the patients ability to tolerate positions and normalize ADLs. 30 min Therapeutic Activity:  []  See flow sheet :  Mechanical spine assessment; discussed positioning and modification of activities/positions to decrease flexion positions and em[phasize neutral and extension positions. Rationale: decrease pain  to improve the patients ability to tolerate positions and ADLs. min Neuromuscular Re-education:  []  See flow sheet :        min Manual Therapy:          min Gait Training:  ___ feet with ___ device on level surfaces with ___ level of assist   Rationale: With   [] TE   [] TA   [] neuro   [] other: Patient Education: [x] Review HEP    [] Progressed/Changed HEP based on:   [] positioning   [] body mechanics   [] transfers   [] heat/ice application    [] other:       Other Objective/Functional Measures:   Innominates aligned. EFRA: centralized LBP to right LBP  (7/10)  PPOE x1 min: centralized LBP to near sacrum midline (7/10)  REIL x10 reps: decreased midline LBP (4/10), but produced right buttocks pain  REIL x20 reps: NE (4/10)  EFRA at counter x10 reps: abolished LE and back pain (0/10)     Pain Level (0-10 scale) post treatment: 6/10 LEs, 5/10 LBP    ASSESSMENT/Changes in Function:    Pt displays a clear extension bias directional preference that appears to be load sensitive. Pt was able to abolish LBP with EFRA at counter allowing her to relieve pain in standing position. Pt is steadily progressing with less pain today.     Patient will continue to benefit from skilled PT services to modify and progress therapeutic interventions, address functional mobility deficits, address ROM deficits, address strength deficits, analyze and address soft tissue restrictions, analyze and cue movement patterns, analyze and modify body mechanics/ergonomics, assess and modify postural abnormalities and address imbalance/dizziness to attain remaining goals. []  See Plan of Care  []  See progress note/recertification  []  See Discharge Summary         Progress towards goals / Updated goals:  Short Term Goals: To be accomplished in 4 weeks:  1) Pt will be instructed with a HEP to promote 100% compliance to improve ROM and mobility.   @ Eval: HEP issued. Current status: progressing with tolerance and compliance @ 50% this date.       2) Pt will report 6-7/10 pain at worst in her neck and back to improve exercise tolerance and mobility.   @ Eval: 10/10 at worst in neck and back with radiating LE pain. Current status: progressing slowly (pain 9/10 at worst) 3/13/2017      3) Pt will have increase FOTO score by 8-10 points to show improve mobility tolerance.  @ Eval: 22/100  Current status: not reassessed      4) Pt will report decreased radiating pain into leg by 50% to show centralization of nerve root pain in her right LE wit use of flexion or extension biased lumbar exercises. @ Eval: 100% or constant right LE radiculopathy along L5 dermatome. Current status: progressing (extension bias directional preference abolished LE and back pain) 3/23/27      5) Pt will have decreased TUG score by 3-5 seconds with LRAD to represent decreased fall risk and improved ambulation tolerance.   @ Eval: 33 sec with SPC and staggering gait sequence. Current status: not reassessed       Long Term Goals: To be accomplished in 8 weeks:  1) Pt will have full CTL AROM in all planes with minimal c/o pain to improve dressing of lower extremity. @ Eval; Pt has 50% limited flexion, chata ROT and SB in CTL spine  Current status: not reassessed      2) Pt will report 4/10 pain at worst in her neck and back to improve exercise tolerance and mobility.   @ Eval: 10/10 at worst in neck and back with radiating LE pain.   Current status: not reassessed      3) Pt will have increase FOTO score by 11-20 points to show improve mobility tolerance.  @ Eval: 22/100  Current status: not reassessed      4) Pt will not have radiating pain into leg to promote return to her PLOF.   @ Eval: 100% or constant right LE radiculopathy along L5 dermatome. Current status: progressing (extension bias directional preference abolished LE and back pain) 3/23/27      5) Pt will have 4+/5 chata LE MMT to improve community ambulation tolerance with LRAD.  @ Eval: chata LE MMT 4-/5 throughout. Current status: not reassessed      6) Pt will have decreased TUG score by 8-10 seconds with LRAD to represent decreased fall risk and improved ambulation tolerance.   @ Eval: 33 sec with SPC and staggering gait sequence. Current status: not reassessed    PLAN  []  Upgrade activities as tolerated     [x]  Continue plan of care  []  Update interventions per flow sheet       []  Discharge due to:_  []  Other:_      Foster Wells, PT 3/23/2017  9:03 AM    No future appointments.

## 2017-06-20 NOTE — PROGRESS NOTES
Pt was last seen on 3/23/2017. PT was unable to further assess progress towards goals at this time due to non-compliance/lack of attendance. Pt reported of constant pain during sessions. Pt exceeded a 30 day policy for now shows. DC at this time with no further instructions to the patient.   Thank you for this referral.

## 2017-10-04 ENCOUNTER — APPOINTMENT (OUTPATIENT)
Dept: GENERAL RADIOLOGY | Age: 52
End: 2017-10-04
Attending: PHYSICIAN ASSISTANT
Payer: OTHER GOVERNMENT

## 2017-10-04 ENCOUNTER — APPOINTMENT (OUTPATIENT)
Dept: CT IMAGING | Age: 52
End: 2017-10-04
Attending: PHYSICIAN ASSISTANT
Payer: OTHER GOVERNMENT

## 2017-10-04 ENCOUNTER — HOSPITAL ENCOUNTER (EMERGENCY)
Age: 52
Discharge: HOME | End: 2017-10-04
Attending: EMERGENCY MEDICINE
Payer: OTHER GOVERNMENT

## 2017-10-04 VITALS
HEIGHT: 68 IN | BODY MASS INDEX: 36.37 KG/M2 | WEIGHT: 240 LBS | TEMPERATURE: 98 F | DIASTOLIC BLOOD PRESSURE: 79 MMHG | OXYGEN SATURATION: 100 % | SYSTOLIC BLOOD PRESSURE: 161 MMHG | HEART RATE: 86 BPM | RESPIRATION RATE: 16 BRPM

## 2017-10-04 DIAGNOSIS — S16.1XXA STRAIN OF NECK MUSCLE, INITIAL ENCOUNTER: Primary | ICD-10-CM

## 2017-10-04 DIAGNOSIS — S39.012A LOW BACK STRAIN, INITIAL ENCOUNTER: ICD-10-CM
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
## 2017-10-04 PROCEDURE — 72110 X-RAY EXAM L-2 SPINE 4/>VWS: CPT

## 2017-10-04 PROCEDURE — 72125 CT NECK SPINE W/O DYE: CPT

## 2017-10-04 PROCEDURE — 99283 EMERGENCY DEPT VISIT LOW MDM: CPT

## 2017-10-04 PROCEDURE — 96372 THER/PROPH/DIAG INJ SC/IM: CPT

## 2017-10-04 PROCEDURE — 74011250636 HC RX REV CODE- 250/636: Performed by: PHYSICIAN ASSISTANT

## 2017-10-04 RX ORDER — DIAZEPAM 5 MG/1
5 TABLET ORAL
Qty: 12 TAB | Refills: 0 | Status: SHIPPED | OUTPATIENT
Start: 2017-10-04

## 2017-10-04 RX ORDER — IBUPROFEN 600 MG/1
600 TABLET ORAL
Qty: 20 TAB | Refills: 0 | Status: SHIPPED | OUTPATIENT
Start: 2017-10-04

## 2017-10-04 RX ORDER — KETOROLAC TROMETHAMINE 30 MG/ML
60 INJECTION, SOLUTION INTRAMUSCULAR; INTRAVENOUS
Status: COMPLETED | OUTPATIENT
Start: 2017-10-04 | End: 2017-10-04

## 2017-10-04 RX ADMIN — KETOROLAC TROMETHAMINE 60 MG: 30 INJECTION, SOLUTION INTRAMUSCULAR at 18:17
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
## 2017-10-05 NOTE — DISCHARGE INSTRUCTIONS
Neck Strain: Care Instructions  Your Care Instructions  You have strained the muscles and ligaments in your neck. A sudden, awkward movement can strain the neck. This often occurs with falls or car accidents or during certain sports. Everyday activities like working on a computer or sleeping can also cause neck strain if they force you to hold your neck in an awkward position for a long time. It is common for neck pain to get worse for a day or two after an injury, but it should start to feel better after that. You may have more pain and stiffness for several days before it gets better. This is expected. It may take a few weeks or longer for it to heal completely. Good home treatment can help you get better faster and avoid future neck problems. Follow-up care is a key part of your treatment and safety. Be sure to make and go to all appointments, and call your doctor if you are having problems. It's also a good idea to know your test results and keep a list of the medicines you take. How can you care for yourself at home? · If you were given a neck brace (cervical collar) to limit neck motion, wear it as instructed for as many days as your doctor tells you to. Do not wear it longer than you were told to. Wearing a brace for too long can make neck stiffness worse and weaken the neck muscles. · You can try using heat or ice to see if it helps. ¨ Try using a heating pad on a low or medium setting for 15 to 20 minutes every 2 to 3 hours. Try a warm shower in place of one session with the heating pad. You can also buy single-use heat wraps that last up to 8 hours. ¨ You can also try an ice pack for 10 to 15 minutes every 2 to 3 hours. · Take pain medicines exactly as directed. ¨ If the doctor gave you a prescription medicine for pain, take it as prescribed. ¨ If you are not taking a prescription pain medicine, ask your doctor if you can take an over-the-counter medicine.   · Gently rub the area to relieve pain and help with blood flow. Do not massage the area if it hurts to do so. · Do not do anything that makes the pain worse. Take it easy for a couple of days. You can do your usual activities if they do not hurt your neck or put it at risk for more stress or injury. · Try sleeping on a special neck pillow. Place it under your neck, not under your head. Placing a tightly rolled-up towel under your neck while you sleep will also work. If you use a neck pillow or rolled towel, do not use your regular pillow at the same time. · To prevent future neck pain, do exercises to stretch and strengthen your neck and back. Learn how to use good posture, safe lifting techniques, and proper body mechanics. When should you call for help? Call 911 anytime you think you may need emergency care. For example, call if:  · You are unable to move an arm or a leg at all. Call your doctor now or seek immediate medical care if:  · You have new or worse symptoms in your arms, legs, chest, belly, or buttocks. Symptoms may include:  ¨ Numbness or tingling. ¨ Weakness. ¨ Pain. · You lose bladder or bowel control. Watch closely for changes in your health, and be sure to contact your doctor if:  · You are not getting better as expected. Where can you learn more? Go to http://eran-morgan.info/. Enter M253 in the search box to learn more about \"Neck Strain: Care Instructions. \"  Current as of: March 21, 2017  Content Version: 11.3  © 7241-1572 [employer]. Care instructions adapted under license by [employer] (which disclaims liability or warranty for this information). If you have questions about a medical condition or this instruction, always ask your healthcare professional. [place] [address] any warranty or liability for your use of this information.          Whiplash: Care Instructions  Your Care Instructions  Whiplash occurs when your head is suddenly forced forward and then snapped backward, as might happen in a car accident or sports injury. This can cause pain and stiffness in your neck. Your head, chest, shoulders, and arms also may hurt. Most whiplash gets better with home care. Your doctor may advise you to take medicine to relieve pain or relax your muscles. He or she may suggest exercise and physical therapy to increase flexibility and relieve pain. You can try wearing a neck (cervical) collar to support your neck. For a while you probably will need to avoid lifting and other activities that can strain the neck. Follow-up care is a key part of your treatment and safety. Be sure to make and go to all appointments, and call your doctor if you are having problems. It's also a good idea to know your test results and keep a list of the medicines you take. How can you care for yourself at home? · Take pain medicines exactly as directed. ¨ If the doctor gave you a prescription medicine for pain, take it as prescribed. ¨ If you are not taking a prescription pain medicine, ask your doctor if you can take an over-the-counter medicine. ¨ Do not take two or more pain medicines at the same time unless the doctor told you to. Many pain medicines have acetaminophen, which is Tylenol. Too much acetaminophen (Tylenol) can be harmful. · You can try using a soft foam collar to support your neck for short periods of time. You can buy one at most [employer]. Do not wear the collar more than 2 or 3 days unless your doctor tells you to. · You can try using heat and ice to see if it helps. ¨ Try using a heating pad on a low or medium setting for 15 to 20 minutes every 2 to 3 hours. Try a warm shower in place of one session with the heating pad. You can also buy single-use heat wraps that last up to 8 hours. ¨ You can also try an ice pack for 10 to 15 minutes every 2 to 3 hours. · Do not do anything that makes the pain worse. Take it easy for a couple of days.  You can do your usual activities if they do not hurt your neck or put it at risk for more stress or injury. Avoid lifting, sports, or other activities that might strain your neck. · Try sleeping on a special neck pillow. Place it under your neck, not under your head. Placing a tightly rolled-up towel under your neck while you sleep will also work. If you use a neck pillow or rolled towel, do not use your regular pillow at the same time. · Once your neck pain is gone, do exercises to stretch your neck and back and make them stronger. Your doctor or physical therapist can tell you which exercises are best.  When should you call for help? Call 911 anytime you think you may need emergency care. For example, call if:  · You are unable to move an arm or a leg at all. Call your doctor now or seek immediate medical care if:  · You have new or worse symptoms in your arms, legs, chest, belly, or buttocks. Symptoms may include:  ¨ Numbness or tingling. ¨ Weakness. ¨ Pain. · You lose bladder or bowel control. Watch closely for changes in your health, and be sure to contact your doctor if:  · You are not getting better as expected. Where can you learn more? Go to http://eran-morgan.info/. Enter S717 in the search box to learn more about \"Whiplash: Care Instructions. \"  Current as of: March 21, 2017  Content Version: 11.3  © 2330-4283 [employer]. Care instructions adapted under license by [employer] (which disclaims liability or warranty for this information). If you have questions about a medical condition or this instruction, always ask your healthcare professional. [address] any warranty or liability for your use of this information.

## 2017-10-05 NOTE — ED NOTES
Aurelio Alcala was discharged in good and improved condition. The patient's diagnosis, condition and treatment were explained to patient and written aftercare instructions were given. The patient verbalized good understanding. Patient armband removed and both labels and armband were placed in shred bin. Patient left ER ambulatory with steady gait in no acute distress. 2 prescriptions provided.

## 2019-08-03 NOTE — ED NOTES
Patient returned from xray Render Post-Care Instructions In Note?: no Medical Necessity Clause: This procedure was medically necessary because the lesions that were treated were: Medical Necessity Information: It is in your best interest to select a reason for this procedure from the list below. All of these items fulfill various CMS LCD requirements except the new and changing color options. Consent: The patient's consent was obtained including but not limited to risks of crusting, scabbing, blistering, scarring, darker or lighter pigmentary change, recurrence, incomplete removal and infection. Detail Level: Simple Number Of Freeze-Thaw Cycles: 1 freeze-thaw cycle Post-Care Instructions: I reviewed with the patient in detail post-care instructions. Patient is to wear sunprotection, and avoid picking at any of the treated lesions. Pt may apply Vaseline to crusted or scabbing areas.